# Patient Record
Sex: MALE | URBAN - NONMETROPOLITAN AREA
[De-identification: names, ages, dates, MRNs, and addresses within clinical notes are randomized per-mention and may not be internally consistent; named-entity substitution may affect disease eponyms.]

---

## 2017-02-03 ENCOUNTER — APPOINTMENT (RX ONLY)
Dept: URBAN - NONMETROPOLITAN AREA CLINIC 1 | Facility: CLINIC | Age: 27
Setting detail: DERMATOLOGY
End: 2017-02-03

## 2017-02-03 VITALS — HEIGHT: 73 IN | WEIGHT: 205 LBS

## 2017-02-03 DIAGNOSIS — L81.7 PIGMENTED PURPURIC DERMATOSIS: ICD-10-CM

## 2017-02-03 PROCEDURE — ? PRESCRIPTION

## 2017-02-03 PROCEDURE — 99202 OFFICE O/P NEW SF 15 MIN: CPT

## 2017-02-03 PROCEDURE — ? TREATMENT REGIMEN

## 2017-02-03 PROCEDURE — ? COUNSELING

## 2017-02-03 RX ORDER — CLOBETASOL PROPIONATE 0.5 MG/G
CREAM TOPICAL
Qty: 1 | Refills: 1 | Status: ERX | COMMUNITY
Start: 2017-02-03

## 2017-02-03 RX ADMIN — CLOBETASOL PROPIONATE 1 APPLICATION: 0.5 CREAM TOPICAL at 00:00

## 2017-02-03 NOTE — HPI: RASH
How Severe Is Your Rash?: moderate
Is This A New Presentation, Or A Follow-Up?: Rash
Additional History: Lesions occurred while in [place] but persists while back in the [place].   doctors have concluded that rash was not a result of being in [place].   Improved temporarily with prednisone taper and triamcinolone cream, but lesions recurred.

## 2017-02-17 ENCOUNTER — APPOINTMENT (RX ONLY)
Dept: URBAN - NONMETROPOLITAN AREA CLINIC 1 | Facility: CLINIC | Age: 27
Setting detail: DERMATOLOGY
End: 2017-02-17

## 2017-02-17 VITALS — WEIGHT: 205 LBS | HEIGHT: 73 IN

## 2017-02-17 DIAGNOSIS — L81.7 PIGMENTED PURPURIC DERMATOSIS: ICD-10-CM | Status: IMPROVED

## 2017-02-17 PROCEDURE — 99212 OFFICE O/P EST SF 10 MIN: CPT

## 2017-02-17 PROCEDURE — ? TREATMENT REGIMEN

## 2017-02-17 PROCEDURE — ? COUNSELING

## 2017-02-17 NOTE — PROCEDURE: TREATMENT REGIMEN
Continue Regimen: Clobetasol cream twice daily as needed.
Detail Level: Zone
Plan: Elevate legs above heart as much as possible.  referral to [facility] given to patient for varicose veins.

## 2018-09-02 ENCOUNTER — OFFICE VISIT (OUTPATIENT)
Dept: URGENT CARE | Facility: CLINIC | Age: 28
End: 2018-09-02

## 2018-09-02 VITALS
TEMPERATURE: 98.6 F | DIASTOLIC BLOOD PRESSURE: 64 MMHG | HEIGHT: 74 IN | WEIGHT: 218.2 LBS | HEART RATE: 68 BPM | BODY MASS INDEX: 28 KG/M2 | SYSTOLIC BLOOD PRESSURE: 114 MMHG | OXYGEN SATURATION: 97 %

## 2018-09-02 DIAGNOSIS — Z02.1 PHYSICAL EXAM, PRE-EMPLOYMENT: ICD-10-CM

## 2018-09-02 PROCEDURE — 7101 PR PHYSICAL: Performed by: PHYSICIAN ASSISTANT

## 2018-09-02 ASSESSMENT — ENCOUNTER SYMPTOMS
NUMBNESS: 0
GASTROINTESTINAL NEGATIVE: 1
WEAKNESS: 0
CARDIOVASCULAR NEGATIVE: 1
HEADACHES: 0
FATIGUE: 0
ABDOMINAL PAIN: 0
VERTIGO: 0
VISUAL CHANGE: 0
RESPIRATORY NEGATIVE: 1
CONSTITUTIONAL NEGATIVE: 1
NECK PAIN: 0
MUSCULOSKELETAL NEGATIVE: 1
PSYCHIATRIC NEGATIVE: 1
JOINT SWELLING: 0
EYES NEGATIVE: 1
MYALGIAS: 0
NEUROLOGICAL NEGATIVE: 1

## 2018-09-02 NOTE — PROGRESS NOTES
Subjective:      Huey Burkett is a 28 y.o. male who presents with Employment Physical            Other   This is a new (emplymt PE ; in gen good health) problem. Pertinent negatives include no abdominal pain, chest pain, fatigue, headaches, joint swelling, myalgias, neck pain, numbness, vertigo, visual change or weakness.       Review of Systems   Constitutional: Negative.  Negative for fatigue.   HENT: Negative.    Eyes: Negative.    Respiratory: Negative.    Cardiovascular: Negative.  Negative for chest pain.   Gastrointestinal: Negative.  Negative for abdominal pain.   Genitourinary: Negative.    Musculoskeletal: Negative.  Negative for joint swelling, myalgias and neck pain.   Skin: Negative.    Neurological: Negative.  Negative for vertigo, weakness, numbness and headaches.   Endo/Heme/Allergies: Negative.    Psychiatric/Behavioral: Negative.    All other systems reviewed and are negative.         Objective:     There were no vitals taken for this visit.     Physical Exam   Constitutional: He is oriented to person, place, and time. He appears well-developed and well-nourished. No distress.   HENT:   Head: Normocephalic and atraumatic.   Right Ear: External ear normal.   Left Ear: External ear normal.   Nose: Nose normal.   Mouth/Throat: Oropharynx is clear and moist. No oropharyngeal exudate.   Eyes: Pupils are equal, round, and reactive to light. Conjunctivae and EOM are normal. No scleral icterus.   Neck: Normal range of motion. Neck supple. No thyromegaly present.   Cardiovascular: Normal rate, regular rhythm and normal heart sounds.    No murmur heard.  Pulmonary/Chest: Effort normal and breath sounds normal. No respiratory distress. He has no wheezes. He has no rales.   Abdominal: Soft. Bowel sounds are normal. He exhibits no distension and no mass. There is no tenderness. No hernia.   Musculoskeletal: Normal range of motion. He exhibits no edema, tenderness or deformity.   Lymphadenopathy:     He has no  cervical adenopathy.   Neurological: He is alert and oriented to person, place, and time. He displays normal reflexes. No cranial nerve deficit or sensory deficit. He exhibits normal muscle tone. Coordination normal.   Skin: Skin is warm and dry. Capillary refill takes less than 2 seconds. No rash noted. No erythema. No pallor.   Psychiatric: He has a normal mood and affect. His behavior is normal. Judgment and thought content normal.   Nursing note and vitals reviewed.              Assessment/Plan:     ·  pre employment PE      · Clear for pre /employment activities, testing etc. Without restrictions. rw

## 2022-08-24 ENCOUNTER — APPOINTMENT (OUTPATIENT)
Dept: RADIOLOGY | Facility: MEDICAL CENTER | Age: 32
End: 2022-08-24
Attending: EMERGENCY MEDICINE
Payer: COMMERCIAL

## 2022-08-24 ENCOUNTER — HOSPITAL ENCOUNTER (EMERGENCY)
Facility: MEDICAL CENTER | Age: 32
End: 2022-08-24
Attending: EMERGENCY MEDICINE
Payer: COMMERCIAL

## 2022-08-24 VITALS
BODY MASS INDEX: 27.16 KG/M2 | HEIGHT: 74 IN | WEIGHT: 211.64 LBS | SYSTOLIC BLOOD PRESSURE: 137 MMHG | DIASTOLIC BLOOD PRESSURE: 65 MMHG | TEMPERATURE: 97.2 F | HEART RATE: 66 BPM | OXYGEN SATURATION: 97 % | RESPIRATION RATE: 17 BRPM

## 2022-08-24 DIAGNOSIS — K52.9 COLITIS: ICD-10-CM

## 2022-08-24 LAB
ALBUMIN SERPL BCP-MCNC: 4.6 G/DL (ref 3.2–4.9)
ALBUMIN/GLOB SERPL: 1.5 G/DL
ALP SERPL-CCNC: 49 U/L (ref 30–99)
ALT SERPL-CCNC: 29 U/L (ref 2–50)
ANION GAP SERPL CALC-SCNC: 13 MMOL/L (ref 7–16)
APPEARANCE UR: CLEAR
AST SERPL-CCNC: 21 U/L (ref 12–45)
BASOPHILS # BLD AUTO: 0.3 % (ref 0–1.8)
BASOPHILS # BLD: 0.03 K/UL (ref 0–0.12)
BILIRUB SERPL-MCNC: 0.8 MG/DL (ref 0.1–1.5)
BILIRUB UR QL STRIP.AUTO: NEGATIVE
BUN SERPL-MCNC: 11 MG/DL (ref 8–22)
CALCIUM SERPL-MCNC: 9.3 MG/DL (ref 8.5–10.5)
CHLORIDE SERPL-SCNC: 106 MMOL/L (ref 96–112)
CO2 SERPL-SCNC: 20 MMOL/L (ref 20–33)
COLOR UR: YELLOW
CREAT SERPL-MCNC: 0.93 MG/DL (ref 0.5–1.4)
EOSINOPHIL # BLD AUTO: 0 K/UL (ref 0–0.51)
EOSINOPHIL NFR BLD: 0 % (ref 0–6.9)
ERYTHROCYTE [DISTWIDTH] IN BLOOD BY AUTOMATED COUNT: 40.5 FL (ref 35.9–50)
GFR SERPLBLD CREATININE-BSD FMLA CKD-EPI: 113 ML/MIN/1.73 M 2
GLOBULIN SER CALC-MCNC: 3.1 G/DL (ref 1.9–3.5)
GLUCOSE SERPL-MCNC: 114 MG/DL (ref 65–99)
GLUCOSE UR STRIP.AUTO-MCNC: NEGATIVE MG/DL
HCT VFR BLD AUTO: 50.6 % (ref 42–52)
HGB BLD-MCNC: 18 G/DL (ref 14–18)
IMM GRANULOCYTES # BLD AUTO: 0.03 K/UL (ref 0–0.11)
IMM GRANULOCYTES NFR BLD AUTO: 0.3 % (ref 0–0.9)
KETONES UR STRIP.AUTO-MCNC: NEGATIVE MG/DL
LEUKOCYTE ESTERASE UR QL STRIP.AUTO: NEGATIVE
LIPASE SERPL-CCNC: 36 U/L (ref 11–82)
LYMPHOCYTES # BLD AUTO: 0.78 K/UL (ref 1–4.8)
LYMPHOCYTES NFR BLD: 7.2 % (ref 22–41)
MCH RBC QN AUTO: 30.5 PG (ref 27–33)
MCHC RBC AUTO-ENTMCNC: 35.6 G/DL (ref 33.7–35.3)
MCV RBC AUTO: 85.8 FL (ref 81.4–97.8)
MICRO URNS: NORMAL
MONOCYTES # BLD AUTO: 0.7 K/UL (ref 0–0.85)
MONOCYTES NFR BLD AUTO: 6.5 % (ref 0–13.4)
NEUTROPHILS # BLD AUTO: 9.27 K/UL (ref 1.82–7.42)
NEUTROPHILS NFR BLD: 85.7 % (ref 44–72)
NITRITE UR QL STRIP.AUTO: NEGATIVE
NRBC # BLD AUTO: 0 K/UL
NRBC BLD-RTO: 0 /100 WBC
PH UR STRIP.AUTO: 6.5 [PH] (ref 5–8)
PLATELET # BLD AUTO: 187 K/UL (ref 164–446)
PMV BLD AUTO: 8.7 FL (ref 9–12.9)
POTASSIUM SERPL-SCNC: 3.7 MMOL/L (ref 3.6–5.5)
PROT SERPL-MCNC: 7.7 G/DL (ref 6–8.2)
PROT UR QL STRIP: NEGATIVE MG/DL
RBC # BLD AUTO: 5.9 M/UL (ref 4.7–6.1)
RBC UR QL AUTO: NEGATIVE
SODIUM SERPL-SCNC: 139 MMOL/L (ref 135–145)
SP GR UR STRIP.AUTO: 1.02
UROBILINOGEN UR STRIP.AUTO-MCNC: 0.2 MG/DL
WBC # BLD AUTO: 10.8 K/UL (ref 4.8–10.8)

## 2022-08-24 PROCEDURE — 700105 HCHG RX REV CODE 258: Performed by: EMERGENCY MEDICINE

## 2022-08-24 PROCEDURE — 85025 COMPLETE CBC W/AUTO DIFF WBC: CPT

## 2022-08-24 PROCEDURE — 99284 EMERGENCY DEPT VISIT MOD MDM: CPT

## 2022-08-24 PROCEDURE — 74177 CT ABD & PELVIS W/CONTRAST: CPT

## 2022-08-24 PROCEDURE — 36415 COLL VENOUS BLD VENIPUNCTURE: CPT

## 2022-08-24 PROCEDURE — 83690 ASSAY OF LIPASE: CPT

## 2022-08-24 PROCEDURE — 81003 URINALYSIS AUTO W/O SCOPE: CPT

## 2022-08-24 PROCEDURE — 700117 HCHG RX CONTRAST REV CODE 255: Performed by: EMERGENCY MEDICINE

## 2022-08-24 PROCEDURE — 80053 COMPREHEN METABOLIC PANEL: CPT

## 2022-08-24 RX ORDER — SODIUM CHLORIDE, SODIUM LACTATE, POTASSIUM CHLORIDE, CALCIUM CHLORIDE 600; 310; 30; 20 MG/100ML; MG/100ML; MG/100ML; MG/100ML
1000 INJECTION, SOLUTION INTRAVENOUS ONCE
Status: COMPLETED | OUTPATIENT
Start: 2022-08-24 | End: 2022-08-24

## 2022-08-24 RX ADMIN — IOHEXOL 100 ML: 350 INJECTION, SOLUTION INTRAVENOUS at 20:41

## 2022-08-24 RX ADMIN — SODIUM CHLORIDE, POTASSIUM CHLORIDE, SODIUM LACTATE AND CALCIUM CHLORIDE 1000 ML: 600; 310; 30; 20 INJECTION, SOLUTION INTRAVENOUS at 19:00

## 2022-08-24 ASSESSMENT — LIFESTYLE VARIABLES: DO YOU DRINK ALCOHOL: NO

## 2022-08-25 NOTE — ED NOTES
Pt given d/c instructions and follow up information with verbal understanding.  VSS at discharge.  PIV d/c'd with tip intact.  Pt ambulatory from the ED w/ steady gait.  All belongings in possession on discharge.  Pt escorted to the lobby by RN.

## 2022-08-25 NOTE — DISCHARGE INSTRUCTIONS
They found a possible bone cyst in your left femur  To make sure that it is completely benign and they recommend a bone scan.  Please follow-up with your regular doctor to have this scheduled.

## 2022-08-25 NOTE — ED PROVIDER NOTES
"ED Provider Note    CHIEF COMPLAINT  Chief Complaint   Patient presents with    Abdominal Pain     Endorses diarrhea, nausea, fevers, chills, generalized pain x 3 days. No dysuria.        HPI  Huey Burkett is a 32 y.o. male who presents chief, abdominal pain.  Duration has been 3 days.  Last associate with fever 104.  Crampy lower quadrant.  No alleviating exacerbating factors.  Now associated with desire to drink.  In general malaise.  Also sick with diarrhea that is improving    He did go hunting about 2 weeks ago.  But he did not have any diarrhea for the week prior.  He works as a .  He is coming here with his significant other who is a nurse.  They are concerned appendicitis as patient had high fever and abdominal pain.    REVIEW OF SYSTEMS  See above.    PAST MEDICAL HISTORY  Past Medical History:   Diagnosis Date    Patient denies medical problems        FAMILY HISTORY  History reviewed. No pertinent family history.    SOCIAL HISTORY  Social History     Socioeconomic History    Marital status: Single   Tobacco Use    Smoking status: Never    Smokeless tobacco: Former     Types: Chew     Quit date: 6/1/2016   Substance and Sexual Activity    Alcohol use: Yes     Alcohol/week: 0.6 oz     Types: 1 Cans of beer per week     Comment: Socially    Drug use: No       SURGICAL HISTORY  History reviewed. No pertinent surgical history.    CURRENT MEDICATIONS  Home Medications       Reviewed by Magnolia Puentes R.N. (Registered Nurse) on 08/24/22 at 1723  Med List Status: Not Addressed     Medication Last Dose Status   sulfamethoxazole-trimethoprim (BACTRIM DS,SEPTRA DS) 800-160 MG Tab oral tablet  Active                     ALLERGIES  No Known Allergies    PHYSICAL EXAM  VITAL SIGNS: /73   Pulse 82   Temp 36.2 °C (97.1 °F) (Temporal)   Resp 16   Ht 1.88 m (6' 2\")   Wt 96 kg (211 lb 10.3 oz)   SpO2 97%   BMI 27.17 kg/m²   Constitutional: Well developed, Well nourished, No acute distress, Non-toxic " appearance.   HENT: Normocephalic, Atraumatic, Bilateral external ears normal, Oropharynx moist, No oral exudates, Nose normal.   Eyes: PERRLA, EOMI, Conjunctiva normal, No discharge.   Musculoskeletal: Neck has normal range of motion, No tenderness, Supple.   Lymphatic: No cervical lymphadenopathy noted.   Cardiovascular: Normal heart rate, Normal rhythm, No murmurs, No rubs, No gallops.   Thorax & Lungs: Normal breath sounds, No respiratory distress, No wheezing, No chest tenderness.   Abdomen: Nondistended minimal tender in the right and left lower quadrant tenderness no rebound or guarding soft  Skin: Warm, Dry, No erythema, No rash.   : No CVA tenderness.   Psychiatric: Calm, not anxious  Neurologic: Alert & oriented, moves all extremities equally    RADIOLOGY/PROCEDURES  Results for orders placed or performed during the hospital encounter of 08/24/22   CBC WITH DIFFERENTIAL   Result Value Ref Range    WBC 10.8 4.8 - 10.8 K/uL    RBC 5.90 4.70 - 6.10 M/uL    Hemoglobin 18.0 14.0 - 18.0 g/dL    Hematocrit 50.6 42.0 - 52.0 %    MCV 85.8 81.4 - 97.8 fL    MCH 30.5 27.0 - 33.0 pg    MCHC 35.6 (H) 33.7 - 35.3 g/dL    RDW 40.5 35.9 - 50.0 fL    Platelet Count 187 164 - 446 K/uL    MPV 8.7 (L) 9.0 - 12.9 fL    Neutrophils-Polys 85.70 (H) 44.00 - 72.00 %    Lymphocytes 7.20 (L) 22.00 - 41.00 %    Monocytes 6.50 0.00 - 13.40 %    Eosinophils 0.00 0.00 - 6.90 %    Basophils 0.30 0.00 - 1.80 %    Immature Granulocytes 0.30 0.00 - 0.90 %    Nucleated RBC 0.00 /100 WBC    Neutrophils (Absolute) 9.27 (H) 1.82 - 7.42 K/uL    Lymphs (Absolute) 0.78 (L) 1.00 - 4.80 K/uL    Monos (Absolute) 0.70 0.00 - 0.85 K/uL    Eos (Absolute) 0.00 0.00 - 0.51 K/uL    Baso (Absolute) 0.03 0.00 - 0.12 K/uL    Immature Granulocytes (abs) 0.03 0.00 - 0.11 K/uL    NRBC (Absolute) 0.00 K/uL   COMP METABOLIC PANEL   Result Value Ref Range    Sodium 139 135 - 145 mmol/L    Potassium 3.7 3.6 - 5.5 mmol/L    Chloride 106 96 - 112 mmol/L    Co2 20 20  - 33 mmol/L    Anion Gap 13.0 7.0 - 16.0    Glucose 114 (H) 65 - 99 mg/dL    Bun 11 8 - 22 mg/dL    Creatinine 0.93 0.50 - 1.40 mg/dL    Calcium 9.3 8.5 - 10.5 mg/dL    AST(SGOT) 21 12 - 45 U/L    ALT(SGPT) 29 2 - 50 U/L    Alkaline Phosphatase 49 30 - 99 U/L    Total Bilirubin 0.8 0.1 - 1.5 mg/dL    Albumin 4.6 3.2 - 4.9 g/dL    Total Protein 7.7 6.0 - 8.2 g/dL    Globulin 3.1 1.9 - 3.5 g/dL    A-G Ratio 1.5 g/dL   LIPASE   Result Value Ref Range    Lipase 36 11 - 82 U/L   ESTIMATED GFR   Result Value Ref Range    GFR (CKD-EPI) 113 >60 mL/min/1.73 m 2        CT-ABDOMEN-PELVIS WITH   Final Result         1.  Mild diffuse colonic wall thickening as described, appearance suggests component of colitis.   2.  Peripherally sclerotic lytic lesion in the proximal left femur, appearance suggests bone cyst, follow up with bone scan for determination of metabolic activity to exclude other lytic bony lesions.            COURSE & MEDICAL DECISION MAKING  Pertinent Labs & Imaging studies reviewed. (See chart for details)  A pleasant gentleman with a chief complaint of fever abdominal pain discomfort.  COVID test that was negative.  At this point differential includes viral less likely appendicitis but cannot be ruled out by history and physical    Plan  CT rule out appendicitis    Laboratory work    IV fluids    Patient reexamined is feeling much better.  At this point the patient CT scan and lab work was evaluated.  We showed the lytic lesion that require Bone scan.  At this point I recommend this is an outpatient basis.    At this point, this is a very pleasant 32-year-old male abdominal pain fever looking well.  Recommend Tylenol 3 times a day BRAT diet.  Return if symptoms worsen    FINAL IMPRESSION  1.  Nonspecific abdominal pain  2.  Diarrhea  3.  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

## 2022-08-25 NOTE — ED TRIAGE NOTES
".  Chief Complaint   Patient presents with    Abdominal Pain     Endorses diarrhea, nausea, fevers, chills, generalized pain x 3 days. No dysuria.        31 yo male ambulatory to triage for above complaint. Abdominal pain protocol ordered.     Pt is alert and oriented, speaking in full sentences, follows commands and responds appropriately to questions.      Patient placed back in lobby and educated on triage process. Asked to inform RN of any changes.    /73   Pulse 82   Temp 36.2 °C (97.1 °F) (Temporal)   Resp 16   Ht 1.88 m (6' 2\")   Wt 96 kg (211 lb 10.3 oz)   SpO2 97%   BMI 27.17 kg/m²     "

## 2025-05-15 ENCOUNTER — APPOINTMENT (OUTPATIENT)
Dept: URGENT CARE | Facility: PHYSICIAN GROUP | Age: 35
End: 2025-05-15
Payer: COMMERCIAL

## 2025-06-18 ENCOUNTER — HOSPITAL ENCOUNTER (OUTPATIENT)
Dept: LAB | Facility: MEDICAL CENTER | Age: 35
End: 2025-06-18
Attending: ORTHOPAEDIC SURGERY
Payer: COMMERCIAL

## 2025-06-18 DIAGNOSIS — M77.52 BURSITIS OF LEFT FOOT: ICD-10-CM

## 2025-06-18 LAB
25(OH)D3 SERPL-MCNC: 32 NG/ML (ref 30–100)
BASOPHILS # BLD AUTO: 0.6 % (ref 0–1.8)
BASOPHILS # BLD: 0.04 K/UL (ref 0–0.12)
CRP SERPL HS-MCNC: <0.3 MG/DL (ref 0–0.75)
EOSINOPHIL # BLD AUTO: 0.11 K/UL (ref 0–0.51)
EOSINOPHIL NFR BLD: 1.7 % (ref 0–6.9)
ERYTHROCYTE [DISTWIDTH] IN BLOOD BY AUTOMATED COUNT: 45.4 FL (ref 35.9–50)
ERYTHROCYTE [SEDIMENTATION RATE] IN BLOOD BY WESTERGREN METHOD: 4 MM/HOUR (ref 0–20)
HCT VFR BLD AUTO: 47 % (ref 42–52)
HGB BLD-MCNC: 16 G/DL (ref 14–18)
IMM GRANULOCYTES # BLD AUTO: 0.03 K/UL (ref 0–0.11)
IMM GRANULOCYTES NFR BLD AUTO: 0.5 % (ref 0–0.9)
LYMPHOCYTES # BLD AUTO: 1.84 K/UL (ref 1–4.8)
LYMPHOCYTES NFR BLD: 27.7 % (ref 22–41)
MCH RBC QN AUTO: 30 PG (ref 27–33)
MCHC RBC AUTO-ENTMCNC: 34 G/DL (ref 32.3–36.5)
MCV RBC AUTO: 88.2 FL (ref 81.4–97.8)
MONOCYTES # BLD AUTO: 0.52 K/UL (ref 0–0.85)
MONOCYTES NFR BLD AUTO: 7.8 % (ref 0–13.4)
NEUTROPHILS # BLD AUTO: 4.1 K/UL (ref 1.82–7.42)
NEUTROPHILS NFR BLD: 61.7 % (ref 44–72)
NRBC # BLD AUTO: 0 K/UL
NRBC BLD-RTO: 0 /100 WBC (ref 0–0.2)
PLATELET # BLD AUTO: 244 K/UL (ref 164–446)
PMV BLD AUTO: 8.9 FL (ref 9–12.9)
RBC # BLD AUTO: 5.33 M/UL (ref 4.7–6.1)
RHEUMATOID FACT SER IA-ACNC: <10 IU/ML (ref 0–14)
T4 FREE SERPL-MCNC: 1.27 NG/DL (ref 0.93–1.7)
URATE SERPL-MCNC: 8 MG/DL (ref 2.5–8.3)
WBC # BLD AUTO: 6.6 K/UL (ref 4.8–10.8)

## 2025-06-18 PROCEDURE — 85652 RBC SED RATE AUTOMATED: CPT

## 2025-06-18 PROCEDURE — 36415 COLL VENOUS BLD VENIPUNCTURE: CPT

## 2025-06-18 PROCEDURE — 85025 COMPLETE CBC W/AUTO DIFF WBC: CPT

## 2025-06-18 PROCEDURE — 86812 HLA TYPING A B OR C: CPT

## 2025-06-18 PROCEDURE — 86038 ANTINUCLEAR ANTIBODIES: CPT

## 2025-06-18 PROCEDURE — 82306 VITAMIN D 25 HYDROXY: CPT

## 2025-06-18 PROCEDURE — 86431 RHEUMATOID FACTOR QUANT: CPT

## 2025-06-18 PROCEDURE — 86140 C-REACTIVE PROTEIN: CPT

## 2025-06-18 PROCEDURE — 84550 ASSAY OF BLOOD/URIC ACID: CPT

## 2025-06-18 PROCEDURE — 84439 ASSAY OF FREE THYROXINE: CPT

## 2025-06-20 LAB
HLA-B27 QL FC: NEGATIVE
NUCLEAR IGG SER QL IA: NORMAL

## 2025-06-24 PROBLEM — M79.672 PAIN OF LEFT FOOT: Status: ACTIVE | Noted: 2025-06-24
